# Patient Record
Sex: MALE | Race: WHITE | Employment: OTHER | URBAN - METROPOLITAN AREA
[De-identification: names, ages, dates, MRNs, and addresses within clinical notes are randomized per-mention and may not be internally consistent; named-entity substitution may affect disease eponyms.]

---

## 2017-04-04 RX ORDER — ATORVASTATIN CALCIUM 20 MG/1
TABLET, FILM COATED ORAL
Qty: 90 TABLET | Refills: 1 | Status: SHIPPED | OUTPATIENT
Start: 2017-04-04 | End: 2017-09-26 | Stop reason: SDUPTHER

## 2017-05-08 ENCOUNTER — TELEPHONE (OUTPATIENT)
Dept: INTERNAL MEDICINE CLINIC | Age: 72
End: 2017-05-08

## 2017-05-08 DIAGNOSIS — Z12.83 SKIN CANCER SCREENING: Primary | ICD-10-CM

## 2017-05-10 DIAGNOSIS — Z12.83 SKIN EXAM, SCREENING FOR CANCER: Primary | ICD-10-CM

## 2017-05-23 ENCOUNTER — TELEPHONE (OUTPATIENT)
Dept: INTERNAL MEDICINE CLINIC | Age: 72
End: 2017-05-23

## 2017-05-23 DIAGNOSIS — R97.20 ELEVATED PSA: Primary | ICD-10-CM

## 2017-05-23 DIAGNOSIS — R53.83 OTHER FATIGUE: ICD-10-CM

## 2017-05-23 DIAGNOSIS — E78.49 OTHER HYPERLIPIDEMIA: ICD-10-CM

## 2017-07-05 LAB
A/G RATIO: 2.1 (CALC) (ref 0.8–2.6)
ALBUMIN ELP: ABNORMAL MG/DL
ALBUMIN SERPL-MCNC: 4.5 G/DL
ALBUMIN SERPL-MCNC: 4.5 GM/DL (ref 3.5–5.2)
ALP BLD-CCNC: 89 U/L
ALP BLD-CCNC: 89 U/L (ref 23–144)
ALT SERPL-CCNC: 26 U/L
ALT SERPL-CCNC: 26 U/L (ref 0–60)
ANION GAP SERPL CALCULATED.3IONS-SCNC: 2.1 MMOL/L
APPEARANCE: CLEAR
AST SERPL-CCNC: 22 U/L
AST SERPL-CCNC: 22 U/L (ref 0–55)
BASOPHILS ABSOLUTE: 0 /ΜL
BASOPHILS ABSOLUTE: 0 K/MM3 (ref 0–0.3)
BASOPHILS RELATIVE PERCENT: 0.4 %
BASOPHILS RELATIVE PERCENT: 0.4 % (ref 0–2)
BILIRUB SERPL-MCNC: 0.6 MG/DL (ref 0.1–1.4)
BILIRUB SERPL-MCNC: 0.6 MG/DL (ref 0–1.2)
BILIRUBIN URINE: NEGATIVE
BILIRUBIN, URINE: NEGATIVE
BLOOD, URINE: NEGATIVE
BLOOD, URINE: NEGATIVE
BUN / CREAT RATIO: 20 (CALC) (ref 7–25)
BUN BLDV-MCNC: 20 MG/DL
BUN BLDV-MCNC: 20 MG/DL (ref 3–29)
CALCIUM SERPL-MCNC: 9.5 MG/DL
CALCIUM SERPL-MCNC: 9.5 MG/DL (ref 8.5–10.5)
CHLORIDE BLD-SCNC: 103 MEQ/L (ref 96–110)
CHLORIDE BLD-SCNC: 103 MMOL/L
CHOLESTEROL, TOTAL: 164 MG/DL
CHOLESTEROL, TOTAL: 164 MG/DL
CHOLESTEROL/HDL RATIO: NORMAL
CLARITY: CLEAR
CO2: 29 MEQ/L (ref 19–32)
CO2: 29 MMOL/L
COLOR: YELLOW
COLOR: YELLOW
CREAT SERPL-MCNC: 1 MG/DL
CREAT SERPL-MCNC: 1 MG/DL
EOSINOPHILS ABSOLUTE: 0.4 /ΜL
EOSINOPHILS ABSOLUTE: 0.4 K/MM3 (ref 0–0.6)
EOSINOPHILS RELATIVE PERCENT: 7.4 %
EOSINOPHILS RELATIVE PERCENT: 7.4 % (ref 0–7)
GFR CALCULATED: 74
GFR SERPL CREATININE-BSD FRML MDRD: 74 ML/MIN/1.73M2
GLOBULIN: 2.1 GM/DL (CALC) (ref 1.9–3.6)
GLUCOSE BLD-MCNC: 129 MG/DL
GLUCOSE BLD-MCNC: 129 MG/DL
GLUCOSE BLD-MCNC: NEGATIVE MG/DL
GLUCOSE URINE: NEGATIVE
HCT VFR BLD CALC: 44.6 % (ref 41–50)
HCT VFR BLD CALC: 44.6 % (ref 41–53)
HDLC SERPL-MCNC: 48 MG/DL
HDLC SERPL-MCNC: 48 MG/DL (ref 35–70)
HEMOGLOBIN: 14.8 G/DL (ref 13.5–17.5)
HEMOGLOBIN: 14.8 G/DL (ref 13.8–17.2)
KETONES, URINE: NEGATIVE
KETONES, URINE: NEGATIVE MG/DL
LDL CHOLESTEROL CALCULATED: 103 MG/DL (ref 0–160)
LDL CHOLESTEROL: 103 MG/DL (CALC)
LEUKOCYTE ESTERASE, URINE: NEGATIVE
LEUKOCYTE ESTERASE, URINE: NEGATIVE
LEUKOCYTES, UA: 5.1 K/MM3 (ref 3.8–10.8)
LYMPHOCYTES ABSOLUTE: 1.3 /ΜL
LYMPHOCYTES ABSOLUTE: 1.3 K/MM3 (ref 0.9–4.1)
LYMPHOCYTES RELATIVE PERCENT: 25 %
LYMPHOCYTES RELATIVE PERCENT: 25 % (ref 14–51)
MCH RBC QN AUTO: 30 PG
MCH RBC QN AUTO: 30 PG (ref 27–33)
MCHC RBC AUTO-ENTMCNC: 33.1 G/DL
MCHC RBC AUTO-ENTMCNC: 33.1 G/DL (ref 32–36)
MCV RBC AUTO: 90.7 FL
MCV RBC AUTO: 90.7 FL (ref 80–100)
MONOCYTES ABSOLUTE: 0.4 /ΜL
MONOCYTES ABSOLUTE: 0.4 K/MM3 (ref 0.2–1.1)
MONOCYTES RELATIVE PERCENT: 7 %
MONOCYTES RELATIVE PERCENT: 7 % (ref 0–14)
NEUTROPHILS ABSOLUTE: 3.1 /ΜL
NEUTROPHILS ABSOLUTE: 3.1 K/MM3 (ref 1.5–7.8)
NEUTROPHILS RELATIVE PERCENT: 60.2 %
NITRITE, URINE: NEGATIVE
NITRITE, URINE: NEGATIVE
PDW BLD-RTO: 13.8 % (ref 9–15)
PH UA: 5.5 (ref 4.5–8)
PH UA: 5.5 (ref 4.5–8)
PLATELET # BLD: 201 K/MM3 (ref 130–400)
PLATELET # BLD: 201 K/ΜL
PMV BLD AUTO: NORMAL FL
POTASSIUM SERPL-SCNC: 4.8 MEQ/L (ref 3.4–5.3)
POTASSIUM SERPL-SCNC: 4.8 MMOL/L
PROSTATE SPECIFIC ANTIGEN: 4.37 NG/ML
PROSTATE SPECIFIC ANTIGEN: 4.37 NG/ML
PROTEIN UA: NORMAL
RBC # BLD: 4.92 10^6/ΜL
RBC # BLD: 4.92 M/MM3 (ref 4.4–5.8)
SEGMENTED NEUTROPHILS RELATIVE PERCENT: 60.2 % (ref 40–76)
SODIUM BLD-SCNC: 142 MEQ/L (ref 135–148)
SODIUM BLD-SCNC: 142 MMOL/L
SPECIFIC GRAVITY UA: 1.03 (ref 1–1.03)
SPECIFIC GRAVITY, URINE: 1.03
TOTAL PROTEIN: 6.6
TOTAL PROTEIN: 6.6 GM/DL (ref 6–8.3)
TRIGL SERPL-MCNC: 63 MG/DL
TRIGL SERPL-MCNC: 63 MG/DL
UROBILINOGEN, URINE: <2 MG/DL (ref 0–2)
UROBILINOGEN, URINE: NORMAL
VLDLC SERPL CALC-MCNC: 13 MG/DL
VLDLC SERPL CALC-MCNC: 13 MG/DL (CALC) (ref 4–38)
WBC # BLD: 5.1 10^3/ML

## 2017-07-12 ENCOUNTER — HOSPITAL ENCOUNTER (OUTPATIENT)
Dept: SURGERY | Age: 72
Discharge: HOME | End: 2017-07-12
Attending: SPECIALIST | Admitting: SPECIALIST

## 2017-07-12 VITALS
SYSTOLIC BLOOD PRESSURE: 117 MMHG | DIASTOLIC BLOOD PRESSURE: 78 MMHG | RESPIRATION RATE: 16 BRPM | HEIGHT: 75 IN | OXYGEN SATURATION: 97 % | BODY MASS INDEX: 29.84 KG/M2 | HEART RATE: 62 BPM | WEIGHT: 240 LBS | TEMPERATURE: 98.1 F

## 2017-07-12 RX ORDER — SODIUM CHLORIDE, SODIUM LACTATE, POTASSIUM CHLORIDE, CALCIUM CHLORIDE 600; 310; 30; 20 MG/100ML; MG/100ML; MG/100ML; MG/100ML
INJECTION, SOLUTION INTRAVENOUS CONTINUOUS
Status: DISCONTINUED | OUTPATIENT
Start: 2017-07-12 | End: 2017-07-13 | Stop reason: HOSPADM

## 2017-07-12 RX ADMIN — SODIUM CHLORIDE, SODIUM LACTATE, POTASSIUM CHLORIDE, CALCIUM CHLORIDE: 600; 310; 30; 20 INJECTION, SOLUTION INTRAVENOUS at 10:54

## 2017-07-12 ASSESSMENT — PAIN SCALES - GENERAL
PAINLEVEL_OUTOF10: 0
PAINLEVEL_OUTOF10: 0

## 2017-07-12 ASSESSMENT — PAIN - FUNCTIONAL ASSESSMENT: PAIN_FUNCTIONAL_ASSESSMENT: 0-10

## 2017-07-25 ENCOUNTER — TELEPHONE (OUTPATIENT)
Dept: INTERNAL MEDICINE CLINIC | Age: 72
End: 2017-07-25

## 2017-08-03 ENCOUNTER — TELEPHONE (OUTPATIENT)
Dept: INTERNAL MEDICINE CLINIC | Age: 72
End: 2017-08-03

## 2017-08-04 ENCOUNTER — OFFICE VISIT (OUTPATIENT)
Dept: INTERNAL MEDICINE CLINIC | Age: 72
End: 2017-08-04

## 2017-08-04 VITALS
SYSTOLIC BLOOD PRESSURE: 126 MMHG | BODY MASS INDEX: 30.92 KG/M2 | HEART RATE: 64 BPM | WEIGHT: 247.4 LBS | DIASTOLIC BLOOD PRESSURE: 80 MMHG | RESPIRATION RATE: 16 BRPM

## 2017-08-04 DIAGNOSIS — R73.09 ELEVATED GLUCOSE: Primary | ICD-10-CM

## 2017-08-04 DIAGNOSIS — E78.2 MIXED HYPERLIPIDEMIA: ICD-10-CM

## 2017-08-04 DIAGNOSIS — R97.20 ELEVATED PSA: ICD-10-CM

## 2017-08-04 PROCEDURE — 1123F ACP DISCUSS/DSCN MKR DOCD: CPT | Performed by: INTERNAL MEDICINE

## 2017-08-04 PROCEDURE — 1036F TOBACCO NON-USER: CPT | Performed by: INTERNAL MEDICINE

## 2017-08-04 PROCEDURE — G8427 DOCREV CUR MEDS BY ELIG CLIN: HCPCS | Performed by: INTERNAL MEDICINE

## 2017-08-04 PROCEDURE — 3017F COLORECTAL CA SCREEN DOC REV: CPT | Performed by: INTERNAL MEDICINE

## 2017-08-04 PROCEDURE — 4040F PNEUMOC VAC/ADMIN/RCVD: CPT | Performed by: INTERNAL MEDICINE

## 2017-08-04 PROCEDURE — G8417 CALC BMI ABV UP PARAM F/U: HCPCS | Performed by: INTERNAL MEDICINE

## 2017-08-04 PROCEDURE — 99213 OFFICE O/P EST LOW 20 MIN: CPT | Performed by: INTERNAL MEDICINE

## 2017-08-16 PROBLEM — D04.70 SQUAMOUS CELL CARCINOMA IN SITU OF SKIN OF LOWER LEG: Status: ACTIVE | Noted: 2017-08-16

## 2017-09-11 DIAGNOSIS — R97.20 ELEVATED PSA: Primary | ICD-10-CM

## 2017-09-26 RX ORDER — ATORVASTATIN CALCIUM 20 MG/1
TABLET, FILM COATED ORAL
Qty: 90 TABLET | Refills: 1 | Status: SHIPPED | OUTPATIENT
Start: 2017-09-26 | End: 2018-03-26 | Stop reason: SDUPTHER

## 2017-12-15 ENCOUNTER — TELEPHONE (OUTPATIENT)
Dept: INTERNAL MEDICINE CLINIC | Age: 72
End: 2017-12-15

## 2017-12-15 ENCOUNTER — OFFICE VISIT (OUTPATIENT)
Dept: INTERNAL MEDICINE CLINIC | Age: 72
End: 2017-12-15

## 2017-12-15 VITALS
BODY MASS INDEX: 30.55 KG/M2 | WEIGHT: 244.4 LBS | HEART RATE: 64 BPM | RESPIRATION RATE: 16 BRPM | DIASTOLIC BLOOD PRESSURE: 80 MMHG | SYSTOLIC BLOOD PRESSURE: 138 MMHG

## 2017-12-15 DIAGNOSIS — E78.2 MIXED HYPERLIPIDEMIA: Primary | ICD-10-CM

## 2017-12-15 DIAGNOSIS — R97.20 ELEVATED PSA: ICD-10-CM

## 2017-12-15 DIAGNOSIS — R94.31 EKG ABNORMALITY: ICD-10-CM

## 2017-12-15 DIAGNOSIS — Z12.2 ENCOUNTER FOR SCREENING FOR LUNG CANCER: ICD-10-CM

## 2017-12-15 DIAGNOSIS — I45.9 SKIPPED HEART BEATS: ICD-10-CM

## 2017-12-15 PROCEDURE — 4040F PNEUMOC VAC/ADMIN/RCVD: CPT | Performed by: INTERNAL MEDICINE

## 2017-12-15 PROCEDURE — 1036F TOBACCO NON-USER: CPT | Performed by: INTERNAL MEDICINE

## 2017-12-15 PROCEDURE — 99214 OFFICE O/P EST MOD 30 MIN: CPT | Performed by: INTERNAL MEDICINE

## 2017-12-15 PROCEDURE — G8417 CALC BMI ABV UP PARAM F/U: HCPCS | Performed by: INTERNAL MEDICINE

## 2017-12-15 PROCEDURE — 3017F COLORECTAL CA SCREEN DOC REV: CPT | Performed by: INTERNAL MEDICINE

## 2017-12-15 PROCEDURE — G8427 DOCREV CUR MEDS BY ELIG CLIN: HCPCS | Performed by: INTERNAL MEDICINE

## 2017-12-15 PROCEDURE — 93000 ELECTROCARDIOGRAM COMPLETE: CPT | Performed by: INTERNAL MEDICINE

## 2017-12-15 PROCEDURE — G8482 FLU IMMUNIZE ORDER/ADMIN: HCPCS | Performed by: INTERNAL MEDICINE

## 2017-12-15 PROCEDURE — 1123F ACP DISCUSS/DSCN MKR DOCD: CPT | Performed by: INTERNAL MEDICINE

## 2017-12-15 NOTE — TELEPHONE ENCOUNTER
Refused to complete consent, said he does it everytime, was not done in August when he was here. Raghu Pierce said Dr Alejandrina Lee said to let it go.

## 2017-12-15 NOTE — PROGRESS NOTES
Subjective:      Dariusz Stewart is a 74 y.o. male who presents today for follow up on his chronic medical conditions as noted below. Patient Active Problem List:     Hyperlipidemia     Prediabetes     Colon cancer screening     Prostate cancer screening     Pulmonary nodules     Squamous cell carcinoma in situ of skin of lower leg       He was last here in Aug  L[address] large part of yr [address] [place]    His PSA has been rising : 3.0 12/2014; 3.01 5/2015; 4.08 12/2016; 4.37 7/2017; 5.3 12/2017  Has free PSA 12.6 % correlating with 35% chance CA    The patient denies urinary hesitancy, nocturia, frequency or incomplete voiding. Leaving for [facility] soon  Will get him appt with [facility] [phone]    FBS in Dec is 105; No evidence NIDDM    Had colonoscopy in July    Had screening CT chest 5/2016; some very small nodules  [place] repeat at [place] May 16, 2018 when he returns from [facility]    The patient is following low fat diet and taking statin without myalgia  Results for Amina Schmidt (MRN [id number]) as of 12/15/2017 10:45   Ref. Range 7/5/2017 10:54   Cholesterol, Total Latest Units: MG/   HDL Cholesterol Latest Units: MG/DL 48   LDL Cholesterol Latest Units: MG/DL (CALC) 103 (H)   Triglycerides Latest Units: MG/DL 63   VLDL Latest Ref Range: 4 - 38 MG/DL (CALC) 13      BP is borderline  Patient denies any exertional chest pain, dyspnea, palpitations, syncope, orthopnea, edema or paroxysmal nocturnal dyspnea. However , EKG done today for simply monitoring purposes with upper limit normal bp shows a large am't of artifact, but could possibly represent controlled , s/w regular A fib      Current Outpatient Prescriptions   Medication Sig Dispense Refill    atorvastatin (LIPITOR) 20 MG tablet TAKE 1 TABLET DAILY 90 tablet 1    Naproxen Sodium (ALEVE PO) Take  by mouth. 2 tabs daily        No current facility-administered medications for this visit.         Past Medical History:   Diagnosis Differential    Comprehensive Metabolic Panel    Lipid Panel    External Referral To Urology    EKG 12 Lead

## 2017-12-19 DIAGNOSIS — D04.72 SQUAMOUS CELL CARCINOMA IN SITU OF SKIN OF LOWER LEG, LEFT: ICD-10-CM

## 2017-12-19 DIAGNOSIS — R73.09 ELEVATED GLUCOSE: ICD-10-CM

## 2017-12-19 DIAGNOSIS — R97.20 ELEVATED PSA: ICD-10-CM

## 2018-03-26 RX ORDER — ATORVASTATIN CALCIUM 20 MG/1
TABLET, FILM COATED ORAL
Qty: 90 TABLET | Refills: 1 | Status: SHIPPED | OUTPATIENT
Start: 2018-03-26

## 2018-05-03 ENCOUNTER — TELEPHONE (OUTPATIENT)
Dept: INTERNAL MEDICINE CLINIC | Age: 73
End: 2018-05-03

## 2018-05-03 DIAGNOSIS — R97.20 ELEVATED PSA: Primary | ICD-10-CM

## 2018-05-09 DIAGNOSIS — R97.20 ELEVATED PSA: ICD-10-CM

## 2018-05-09 DIAGNOSIS — E78.2 MIXED HYPERLIPIDEMIA: ICD-10-CM

## 2018-05-09 LAB
A/G RATIO: 2 (ref 1.1–2.2)
ALBUMIN SERPL-MCNC: 4.5 G/DL (ref 3.4–5)
ALP BLD-CCNC: 99 U/L (ref 40–129)
ALT SERPL-CCNC: 29 U/L (ref 10–40)
ANION GAP SERPL CALCULATED.3IONS-SCNC: 13 MMOL/L (ref 3–16)
AST SERPL-CCNC: 19 U/L (ref 15–37)
BASOPHILS ABSOLUTE: 0 K/UL (ref 0–0.2)
BASOPHILS RELATIVE PERCENT: 0.2 %
BILIRUB SERPL-MCNC: 0.8 MG/DL (ref 0–1)
BUN BLDV-MCNC: 21 MG/DL (ref 7–20)
CALCIUM SERPL-MCNC: 9.2 MG/DL (ref 8.3–10.6)
CHLORIDE BLD-SCNC: 101 MMOL/L (ref 99–110)
CHOLESTEROL, TOTAL: 179 MG/DL (ref 0–199)
CO2: 27 MMOL/L (ref 21–32)
CREAT SERPL-MCNC: 0.8 MG/DL (ref 0.8–1.3)
EOSINOPHILS ABSOLUTE: 0.3 K/UL (ref 0–0.6)
EOSINOPHILS RELATIVE PERCENT: 6.5 %
GFR AFRICAN AMERICAN: >60
GFR NON-AFRICAN AMERICAN: >60
GLOBULIN: 2.2 G/DL
GLUCOSE BLD-MCNC: 103 MG/DL (ref 70–99)
HCT VFR BLD CALC: 42.5 % (ref 40.5–52.5)
HDLC SERPL-MCNC: 50 MG/DL (ref 40–60)
HEMOGLOBIN: 14.8 G/DL (ref 13.5–17.5)
LDL CHOLESTEROL CALCULATED: 107 MG/DL
LYMPHOCYTES ABSOLUTE: 1 K/UL (ref 1–5.1)
LYMPHOCYTES RELATIVE PERCENT: 22.4 %
MCH RBC QN AUTO: 31.1 PG (ref 26–34)
MCHC RBC AUTO-ENTMCNC: 34.9 G/DL (ref 31–36)
MCV RBC AUTO: 89.2 FL (ref 80–100)
MONOCYTES ABSOLUTE: 0.4 K/UL (ref 0–1.3)
MONOCYTES RELATIVE PERCENT: 8.6 %
NEUTROPHILS ABSOLUTE: 2.9 K/UL (ref 1.7–7.7)
NEUTROPHILS RELATIVE PERCENT: 62.3 %
PDW BLD-RTO: 13.8 % (ref 12.4–15.4)
PLATELET # BLD: 193 K/UL (ref 135–450)
PMV BLD AUTO: 9.9 FL (ref 5–10.5)
POTASSIUM SERPL-SCNC: 4.6 MMOL/L (ref 3.5–5.1)
RBC # BLD: 4.76 M/UL (ref 4.2–5.9)
SODIUM BLD-SCNC: 141 MMOL/L (ref 136–145)
TOTAL PROTEIN: 6.7 G/DL (ref 6.4–8.2)
TRIGL SERPL-MCNC: 108 MG/DL (ref 0–150)
VLDLC SERPL CALC-MCNC: 22 MG/DL
WBC # BLD: 4.6 K/UL (ref 4–11)

## 2018-05-14 LAB
PROSTATE SPECIFIC ANTIGEN FREE: 0.2 UG/L
PROSTATE SPECIFIC ANTIGEN PERCENT FREE: 10 %
PROSTATE SPECIFIC ANTIGEN: 2 UG/L (ref 0–4)

## 2018-05-16 ENCOUNTER — OFFICE VISIT (OUTPATIENT)
Dept: INTERNAL MEDICINE CLINIC | Age: 73
End: 2018-05-16

## 2018-05-16 VITALS
DIASTOLIC BLOOD PRESSURE: 78 MMHG | RESPIRATION RATE: 16 BRPM | HEIGHT: 75 IN | BODY MASS INDEX: 30.84 KG/M2 | HEART RATE: 60 BPM | SYSTOLIC BLOOD PRESSURE: 128 MMHG | WEIGHT: 248 LBS

## 2018-05-16 DIAGNOSIS — E78.2 MIXED HYPERLIPIDEMIA: Primary | ICD-10-CM

## 2018-05-16 DIAGNOSIS — R97.20 ELEVATED PSA: ICD-10-CM

## 2018-05-16 DIAGNOSIS — R91.1 PULMONARY NODULE: ICD-10-CM

## 2018-05-16 PROCEDURE — 3017F COLORECTAL CA SCREEN DOC REV: CPT | Performed by: INTERNAL MEDICINE

## 2018-05-16 PROCEDURE — 99213 OFFICE O/P EST LOW 20 MIN: CPT | Performed by: INTERNAL MEDICINE

## 2018-05-16 PROCEDURE — 3288F FALL RISK ASSESSMENT DOCD: CPT | Performed by: INTERNAL MEDICINE

## 2018-05-16 PROCEDURE — G8417 CALC BMI ABV UP PARAM F/U: HCPCS | Performed by: INTERNAL MEDICINE

## 2018-05-16 PROCEDURE — G8510 SCR DEP NEG, NO PLAN REQD: HCPCS | Performed by: INTERNAL MEDICINE

## 2018-05-16 PROCEDURE — 4040F PNEUMOC VAC/ADMIN/RCVD: CPT | Performed by: INTERNAL MEDICINE

## 2018-05-16 PROCEDURE — 1123F ACP DISCUSS/DSCN MKR DOCD: CPT | Performed by: INTERNAL MEDICINE

## 2018-05-16 PROCEDURE — 1036F TOBACCO NON-USER: CPT | Performed by: INTERNAL MEDICINE

## 2018-05-16 PROCEDURE — G8427 DOCREV CUR MEDS BY ELIG CLIN: HCPCS | Performed by: INTERNAL MEDICINE

## 2018-05-16 ASSESSMENT — PATIENT HEALTH QUESTIONNAIRE - PHQ9
SUM OF ALL RESPONSES TO PHQ QUESTIONS 1-9: 0
2. FEELING DOWN, DEPRESSED OR HOPELESS: 0
SUM OF ALL RESPONSES TO PHQ9 QUESTIONS 1 & 2: 0
1. LITTLE INTEREST OR PLEASURE IN DOING THINGS: 0

## 2018-09-18 ENCOUNTER — TELEPHONE (OUTPATIENT)
Dept: INTERNAL MEDICINE CLINIC | Age: 73
End: 2018-09-18